# Patient Record
(demographics unavailable — no encounter records)

---

## 2024-10-16 NOTE — HISTORY OF PRESENT ILLNESS
[de-identified] : Patient presents the office today for physical exam and fasting blood work GI issues and doing much better however not sure if it is because patient has been off the metformin or because patient has changed diet and started Benefiber Diet has been doing better Walking no exercise routine   [No Pertinent Cardiac History] : no history of aortic stenosis, atrial fibrillation, coronary artery disease, recent myocardial infarction, or implantable device/pacemaker [No Pertinent Pulmonary History] : no history of asthma, COPD, sleep apnea, or smoking [No Adverse Anesthesia Reaction] : no adverse anesthesia reaction in self or family member [Diabetes] : diabetes [(Patient denies any chest pain, claudication, dyspnea on exertion, orthopnea, palpitations or syncope)] : Patient denies any chest pain, claudication, dyspnea on exertion, orthopnea, palpitations or syncope [Chronic Anticoagulation] : no chronic anticoagulation [Chronic Kidney Disease] : no chronic kidney disease [FreeTextEntry1] : Cataract surgery [FreeTextEntry2] : 10/22 right eye and 11/5 Left eye [FreeTextEntry3] : Dr Amador  [FreeTextEntry4] : Pt present to the office today for medical clearance for Cataract surgery 10/22 right eye and 11/5 Left eye with Dr Amador.

## 2024-10-16 NOTE — HISTORY OF PRESENT ILLNESS
[de-identified] : Patient presents the office today for physical exam and fasting blood work GI issues and doing much better however not sure if it is because patient has been off the metformin or because patient has changed diet and started Benefiber Diet has been doing better Walking no exercise routine   [No Pertinent Cardiac History] : no history of aortic stenosis, atrial fibrillation, coronary artery disease, recent myocardial infarction, or implantable device/pacemaker [No Pertinent Pulmonary History] : no history of asthma, COPD, sleep apnea, or smoking [No Adverse Anesthesia Reaction] : no adverse anesthesia reaction in self or family member [Diabetes] : diabetes [(Patient denies any chest pain, claudication, dyspnea on exertion, orthopnea, palpitations or syncope)] : Patient denies any chest pain, claudication, dyspnea on exertion, orthopnea, palpitations or syncope [Chronic Anticoagulation] : no chronic anticoagulation [Chronic Kidney Disease] : no chronic kidney disease [FreeTextEntry1] : Cataract surgery [FreeTextEntry2] : 10/22 right eye and 11/5 Left eye [FreeTextEntry3] : Dr Amador  [FreeTextEntry4] : Pt present to the office today for medical clearance for Cataract surgery 10/22 right eye and 11/5 Left eye with Dr Amador.

## 2024-10-16 NOTE — HEALTH RISK ASSESSMENT
[Good] : ~his/her~  mood as  good [No falls in past year] : Patient reported no falls in the past year [Little interest or pleasure doing things] : 1) Little interest or pleasure doing things [Feeling down, depressed, or hopeless] : 2) Feeling down, depressed, or hopeless [0] : 2) Feeling down, depressed, or hopeless: Not at all (0) [PHQ-2 Negative - No further assessment needed] : PHQ-2 Negative - No further assessment needed [[id number]] : 0 [Never] : Never [Change in mental status noted] : No change in mental status noted [Language] : denies difficulty with language [Behavior] : denies difficulty with behavior [None] : None [With Significant Other] : lives with significant other [] :  [# Of Children ___] : has [unfilled] children [Fully functional (bathing, dressing, toileting, transferring, walking, feeding)] : Fully functional (bathing, dressing, toileting, transferring, walking, feeding) [Fully functional (using the telephone, shopping, preparing meals, housekeeping, doing laundry, using] : Fully functional and needs no help or supervision to perform IADLs (using the telephone, shopping, preparing meals, housekeeping, doing laundry, using transportation, managing medications and managing finances)

## 2024-10-16 NOTE — HEALTH RISK ASSESSMENT
[Good] : ~his/her~  mood as  good [No falls in past year] : Patient reported no falls in the past year [Little interest or pleasure doing things] : 1) Little interest or pleasure doing things [Feeling down, depressed, or hopeless] : 2) Feeling down, depressed, or hopeless [0] : 2) Feeling down, depressed, or hopeless: Not at all (0) [PHQ-2 Negative - No further assessment needed] : PHQ-2 Negative - No further assessment needed [[id number]] : 0 [Never] : Never [Change in mental status noted] : No change in mental status noted [Language] : denies difficulty with language [Behavior] : denies difficulty with behavior [None] : None [With Significant Other] : lives with significant other [] :  [# Of Children ___] : has [unfilled] children [Fully functional (bathing, dressing, toileting, transferring, walking, feeding)] : Fully functional (bathing, dressing, toileting, transferring, walking, feeding) [Fully functional (using the telephone, shopping, preparing meals, housekeeping, doing laundry, using] : Fully functional and needs no help or supervision to perform IADLs (using the telephone, shopping, preparing meals, housekeeping, doing laundry, using transportation, managing medications and managing finances)

## 2024-10-16 NOTE — HISTORY OF PRESENT ILLNESS
[de-identified] : Patient presents the office today for physical exam and fasting blood work GI issues and doing much better however not sure if it is because patient has been off the metformin or because patient has changed diet and started Benefiber Diet has been doing better Walking no exercise routine   [No Pertinent Cardiac History] : no history of aortic stenosis, atrial fibrillation, coronary artery disease, recent myocardial infarction, or implantable device/pacemaker [No Pertinent Pulmonary History] : no history of asthma, COPD, sleep apnea, or smoking [No Adverse Anesthesia Reaction] : no adverse anesthesia reaction in self or family member [Diabetes] : diabetes [(Patient denies any chest pain, claudication, dyspnea on exertion, orthopnea, palpitations or syncope)] : Patient denies any chest pain, claudication, dyspnea on exertion, orthopnea, palpitations or syncope [Chronic Anticoagulation] : no chronic anticoagulation [Chronic Kidney Disease] : no chronic kidney disease [FreeTextEntry1] : Cataract surgery [FreeTextEntry2] : 10/22 right eye and 11/5 Left eye [FreeTextEntry3] : Dr Amador  [FreeTextEntry4] : Pt present to the office today for medical clearance for Cataract surgery 10/22 right eye and 11/5 Left eye with Dr Amador.

## 2024-10-16 NOTE — HEALTH RISK ASSESSMENT
[Good] : ~his/her~  mood as  good [No falls in past year] : Patient reported no falls in the past year [Little interest or pleasure doing things] : 1) Little interest or pleasure doing things [Feeling down, depressed, or hopeless] : 2) Feeling down, depressed, or hopeless [0] : 2) Feeling down, depressed, or hopeless: Not at all (0) [PHQ-2 Negative - No further assessment needed] : PHQ-2 Negative - No further assessment needed [[id number]] : 0 [Never] : Never [Change in mental status noted] : No change in mental status noted [Language] : denies difficulty with language [Behavior] : denies difficulty with behavior [None] : None [With Significant Other] : lives with significant other [] :  [# Of Children ___] : has [unfilled] children [Fully functional (bathing, dressing, toileting, transferring, walking, feeding)] : Fully functional (bathing, dressing, toileting, transferring, walking, feeding) [Fully functional (using the telephone, shopping, preparing meals, housekeeping, doing laundry, using] : Fully functional and needs no help or supervision to perform IADLs (using the telephone, shopping, preparing meals, housekeeping, doing laundry, using transportation, managing medications and managing finances)

## 2025-02-07 NOTE — REVIEW OF SYSTEMS
[Fever] : no fever [Chills] : no chills [Fatigue] : fatigue [Nasal Discharge] : nasal discharge [Sore Throat] : sore throat [Anxiety] : anxiety [Negative] : Respiratory

## 2025-02-07 NOTE — PLAN
[FreeTextEntry1] : Rapid strep test negative Patient appears to be suffering with viral illness recommend patient start allergy medicine over-the-counter continue with Tylenol or Advil when needed Follow-up in 2 to 3 days if not improving or if any worsening symptoms Recommend patient increasing her Zoloft from 50 mg up to 100 mg Recommend she increase to the next week to 75 mg 1 1/2 pills and then increase to the 100 mg pill Patient has follow-up with me next month

## 2025-02-07 NOTE — HISTORY OF PRESENT ILLNESS
[FreeTextEntry8] : Patient presents to the office today with mild sore throat and slightly achy for the last week Patient has no fevers no chills Patient has some increased anxiety she just got a new puppy and her son and daughter-in-law are trying IVF Patient has been seeing therapist and recommended possibly increasing her SSRI

## 2025-02-10 NOTE — HISTORY OF PRESENT ILLNESS
[FreeTextEntry1] : HPI: Patient is a 63yo female presenting for her well woman exam. Patient is without complaints today.  ROS: neg unless specified in HPI  Gyn Hx: Menopause: No HRT, No PMB H/o BSO due to endometriosis, no hysterectomy. Pap: 2024 NILM with neg hr hpv Breast: 11/2024 BIRADS 1  Gyn: Normal atrophic external genitalia, atrophic vagina and cervix, no CMT bimanual with mobile uterus, no fixed adnexal masses or tenderness Bimanual limited by body habitus  Breast: No lymphadenopathy in the neck, chest wall, bilateral supraclavicular, infraclavicular, and bilateral axillary areas. No overt asymmetry in bilateral breast contour with normal appearing skin and normal appearing nipple areolar complex bilaterally. No nipple discharge expressed.  Plan: Gyn Screening: - Pap: cytology + automatic HR HPV sent - Vaginal dryness: using vaginal estrogen, discussed increase in dose and vaginal moisturizers  Breast Screening: - Discussed self-breast exam - Clinical breast exam performed - Mammogram recently BIRADS 1  AHM: CV, Pulmonary, Endocrine, GI, Bone Screening, Vitamin supplementation, and Immunizations with PCP  SHADI Ford MD

## 2025-04-30 NOTE — HEALTH RISK ASSESSMENT
[No falls in past year] : Patient reported no falls in the past year [Little interest or pleasure doing things] : 1) Little interest or pleasure doing things [Feeling down, depressed, or hopeless] : 2) Feeling down, depressed, or hopeless [0] : 2) Feeling down, depressed, or hopeless: Not at all (0) [PHQ-2 Negative - No further assessment needed] : PHQ-2 Negative - No further assessment needed [[id number]] : 0

## 2025-04-30 NOTE — PLAN
[FreeTextEntry1] : FBW done in office today Continue to improve diet and exercise Start stretching before exercise and at bedtime.

## 2025-04-30 NOTE — HISTORY OF PRESENT ILLNESS
[de-identified] : Patient presents to the office today for follow-up and fasting blood work Patient's been doing well with diet and staying active with walking She does get intermittent left hip pain however she is not stretching before walking and hip pain Patient is compliant with her medications no complaints of side effects